# Patient Record
Sex: FEMALE | Race: WHITE | Employment: OTHER | URBAN - METROPOLITAN AREA
[De-identification: names, ages, dates, MRNs, and addresses within clinical notes are randomized per-mention and may not be internally consistent; named-entity substitution may affect disease eponyms.]

---

## 2017-01-01 ENCOUNTER — HOSPITAL ENCOUNTER (OUTPATIENT)
Dept: OUTPATIENT PROCEDURES | Facility: CLINIC | Age: 82
Discharge: HOME | End: 2017-06-09
Attending: OPHTHALMOLOGY | Admitting: OPHTHALMOLOGY
Payer: MEDICARE

## 2017-01-01 ENCOUNTER — OFFICE VISIT (OUTPATIENT)
Dept: FAMILY MEDICINE | Facility: CLINIC | Age: 82
End: 2017-01-01
Payer: COMMERCIAL

## 2017-01-01 ENCOUNTER — TRANSFERRED RECORDS (OUTPATIENT)
Dept: HEALTH INFORMATION MANAGEMENT | Facility: CLINIC | Age: 82
End: 2017-01-01

## 2017-01-01 VITALS
HEART RATE: 71 BPM | SYSTOLIC BLOOD PRESSURE: 131 MMHG | DIASTOLIC BLOOD PRESSURE: 77 MMHG | WEIGHT: 127.4 LBS | BODY MASS INDEX: 25.01 KG/M2 | HEIGHT: 60 IN | TEMPERATURE: 96.9 F

## 2017-01-01 DIAGNOSIS — E03.9 ACQUIRED HYPOTHYROIDISM: ICD-10-CM

## 2017-01-01 DIAGNOSIS — L91.8 SKIN TAG: ICD-10-CM

## 2017-01-01 DIAGNOSIS — G25.81 RESTLESS LEG SYNDROME: ICD-10-CM

## 2017-01-01 DIAGNOSIS — Z00.00 MEDICARE ANNUAL WELLNESS VISIT, SUBSEQUENT: Primary | ICD-10-CM

## 2017-01-01 DIAGNOSIS — I10 HYPERTENSION GOAL BP (BLOOD PRESSURE) < 140/90: ICD-10-CM

## 2017-01-01 DIAGNOSIS — G62.9 PERIPHERAL POLYNEUROPATHY: ICD-10-CM

## 2017-01-01 DIAGNOSIS — L85.1 ACQUIRED KERATODERMA: ICD-10-CM

## 2017-01-01 LAB
ANION GAP SERPL CALCULATED.3IONS-SCNC: 8 MMOL/L (ref 3–14)
BUN SERPL-MCNC: 20 MG/DL (ref 7–30)
CALCIUM SERPL-MCNC: 9.1 MG/DL (ref 8.5–10.1)
CHLORIDE SERPL-SCNC: 105 MMOL/L (ref 94–109)
CO2 SERPL-SCNC: 25 MMOL/L (ref 20–32)
CREAT SERPL-MCNC: 0.86 MG/DL (ref 0.52–1.04)
GFR SERPL CREATININE-BSD FRML MDRD: 61 ML/MIN/1.7M2
GLUCOSE SERPL-MCNC: 89 MG/DL (ref 70–99)
POTASSIUM SERPL-SCNC: 4.5 MMOL/L (ref 3.4–5.3)
SODIUM SERPL-SCNC: 138 MMOL/L (ref 133–144)
TSH SERPL DL<=0.005 MIU/L-ACNC: 1.68 MU/L (ref 0.4–4)

## 2017-01-01 PROCEDURE — G0438 PPPS, INITIAL VISIT: HCPCS | Performed by: FAMILY MEDICINE

## 2017-01-01 PROCEDURE — 36415 COLL VENOUS BLD VENIPUNCTURE: CPT | Performed by: FAMILY MEDICINE

## 2017-01-01 PROCEDURE — 80048 BASIC METABOLIC PNL TOTAL CA: CPT | Performed by: FAMILY MEDICINE

## 2017-01-01 PROCEDURE — 84443 ASSAY THYROID STIM HORMONE: CPT | Performed by: FAMILY MEDICINE

## 2017-01-01 PROCEDURE — 11200 RMVL SKIN TAGS UP TO&INC 15: CPT | Performed by: FAMILY MEDICINE

## 2017-01-01 PROCEDURE — 66821 AFTER CATARACT LASER SURGERY: CPT | Mod: 50 | Performed by: OPHTHALMOLOGY

## 2017-01-01 RX ORDER — GABAPENTIN 300 MG/1
CAPSULE ORAL
Qty: 450 CAPSULE | Refills: 0 | Status: SHIPPED | OUTPATIENT
Start: 2017-01-01 | End: 2017-01-01

## 2017-01-01 RX ORDER — GABAPENTIN 300 MG/1
CAPSULE ORAL
Qty: 450 CAPSULE | Refills: 3 | Status: SHIPPED | OUTPATIENT
Start: 2017-01-01

## 2017-01-01 RX ORDER — LEVOTHYROXINE SODIUM 50 UG/1
50 TABLET ORAL DAILY
Qty: 90 TABLET | Refills: 3 | Status: SHIPPED | OUTPATIENT
Start: 2017-01-01

## 2017-01-01 RX ORDER — LISINOPRIL 5 MG/1
TABLET ORAL
Qty: 90 TABLET | Refills: 0 | Status: SHIPPED | OUTPATIENT
Start: 2017-01-01 | End: 2017-01-01

## 2017-01-01 RX ORDER — AMMONIUM LACTATE 12 G/100G
CREAM TOPICAL 2 TIMES DAILY
Qty: 280 G | Refills: 11 | Status: SHIPPED | OUTPATIENT
Start: 2017-01-01

## 2017-01-01 RX ORDER — LISINOPRIL 5 MG/1
5 TABLET ORAL DAILY
Qty: 30 TABLET | Refills: 0 | Status: SHIPPED | OUTPATIENT
Start: 2017-01-01 | End: 2017-01-01

## 2017-01-01 RX ORDER — LISINOPRIL 5 MG/1
5 TABLET ORAL DAILY
Qty: 90 TABLET | Refills: 3 | Status: SHIPPED | OUTPATIENT
Start: 2017-01-01

## 2017-04-11 NOTE — TELEPHONE ENCOUNTER
Routing refill request to provider for review/approval because:  Drug not on the [facility] refill protocol   Please advise   Thank you   Yazmin FRIEDMAN RN

## 2017-04-11 NOTE — TELEPHONE ENCOUNTER
Gabapentin      Last Written Prescription Date: 03/14/16  Last Quantity: 450, # refills: 3  Last Office Visit with [facility], [facility] or [facility] prescribing provider: 03/14/16       Creatinine   Date Value Ref Range Status   03/11/2016 0.81 0.52 - 1.04 mg/dL Final     No results found for: AST  No results found for: ALT  BP Readings from Last 3 Encounters:   03/14/16 126/78   01/21/16 156/86   03/12/15 140/76

## 2017-04-19 NOTE — TELEPHONE ENCOUNTER
lisinopril (PRINIVIL,ZESTRIL) 5 MG tablet      Last Written Prescription Date: 3/14/16  Last Fill Quantity: 90, # refills: 3  Last Office Visit with [facility] or [facility] prescribing provider: 3/14/16       Potassium   Date Value Ref Range Status   03/11/2016 4.4 3.4 - 5.3 mmol/L Final     Creatinine   Date Value Ref Range Status   03/11/2016 0.81 0.52 - 1.04 mg/dL Final     BP Readings from Last 3 Encounters:   03/14/16 126/78   01/21/16 156/86   03/12/15 140/76

## 2017-05-22 NOTE — TELEPHONE ENCOUNTER
Routing refill request to provider for review/approval because:  Jing given x1 and patient did not follow up, please advise  Labs not current:  Older than 1 year.   Pt states that she needs the medication today, and can not come into the clinic for an appt until June.   Please advise   Yazmin FRIEDMAN RN            Lisinopril   Last Written Prescription Date: 04/21/17  Last Fill Quantity: 30, # refills: 0   Last Office Visit with [facility], [facility] or [facility] prescribing provider: 03/14/16   Future Office Visit:     Potassium   Date Value Ref Range Status   03/11/2016 4.4 3.4 - 5.3 mmol/L Final     Creatinine   Date Value Ref Range Status   03/11/2016 0.81 0.52 - 1.04 mg/dL Final     BP Readings from Last 3 Encounters:   03/14/16 126/78   01/21/16 156/86   03/12/15 140/76

## 2017-05-22 NOTE — TELEPHONE ENCOUNTER
Reason for Call:  Other prescription    Detailed comments: Pt called stating that she wants Lisinopril refill today - I reminded pt that she is overdue for a clinic visit and she states that she cannot get in for an appt until June and she needs the refill ASAP today.  Please call patient  and advise.    Lisinopril      Last Written Prescription Date: 04/21/17  Last Fill Quantity: 30, # refills: 0    Last Office Visit with [facility] or [facility] prescribing provider:  03/14/16   Future Office Visit:        BP Readings from Last 3 Encounters:   03/14/16 126/78   01/21/16 156/86   03/12/15 140/76     Phone Number Patient can be reached at: Home number on file [phone] (home)    Best Time: any    Can we leave a detailed message on this number? YES    Call taken on 5/22/2017 at 1:13 PM by Cammy Marie

## 2017-06-05 PROBLEM — E03.9 ACQUIRED HYPOTHYROIDISM: Status: ACTIVE | Noted: 2017-01-01

## 2017-06-05 NOTE — MR AVS SNAPSHOT
After Visit Summary   6/5/2017    Silvana George    MRN: [id number]           Patient Information     Date Of Birth          6/27/1924        Visit Information        Provider Department      6/5/2017 9:00 AM ADWOA Lopes MD [facility]        Today's Diagnoses     Medicare annual wellness visit, subsequent    -  1    Hypertension goal BP (blood pressure) < 140/90        Acquired keratoderma        Peripheral polyneuropathy (HCC)        Restless leg syndrome        Acquired hypothyroidism        Skin tag          Care Instructions      Preventive Health Recommendations    Female Ages 65 +    Yearly exam:     See your health care provider every year in order to  o Review health changes.   o Discuss preventive care.    o Review your medicines if your doctor has prescribed any.      You no longer need a yearly Pap test unless you've had an abnormal Pap test in the past 10 years. If you have vaginal symptoms, such as bleeding or discharge, be sure to talk with your provider about a Pap test.      Every 1 to 2 years, have a mammogram.  If you are over 69, talk with your health care provider about whether or not you want to continue having screening mammograms.      Every 10 years, have a colonoscopy. Or, have a yearly FIT test (stool test). These exams will check for colon cancer.       Have a cholesterol test every 5 years, or more often if your doctor advises it.       Have a diabetes test (fasting glucose) every three years. If you are at risk for diabetes, you should have this test more often.       At age 65, have a bone density scan (DEXA) to check for osteoporosis (brittle bone disease).    Shots:    Get a flu shot each year.    Get a tetanus shot every 10 years.    Talk to your doctor about your pneumonia vaccines. There are now two you should receive - Pneumovax (PPSV 23) and Prevnar (PCV 13).    Talk to your doctor about the shingles vaccine.    Talk to your doctor about the  "hepatitis B vaccine.    Nutrition:     Eat at least 5 servings of fruits and vegetables each day.      Eat whole-grain bread, whole-wheat pasta and brown rice instead of white grains and rice.      Talk to your provider about Calcium and Vitamin D.     Lifestyle    Exercise at least 150 minutes a week (30 minutes a day, 5 days a week). This will help you control your weight and prevent disease.      Limit alcohol to one drink per day.      No smoking.       Wear sunscreen to prevent skin cancer.       See your dentist twice a year for an exam and cleaning.      See your eye doctor every 1 to 2 years to screen for conditions such as glaucoma, macular degeneration and cataracts.          Follow-ups after your visit        Who to contact     If you have questions or need follow up information about today's clinic visit or your schedule please contact [facility] directly at [phone].  Normal or non-critical lab and imaging results will be communicated to you by Citymap[employer]art, letter or phone within 4 business days after the clinic has received the results. If you do not hear from us within 7 days, please contact the clinic through Citymap[employer]art or phone. If you have a critical or abnormal lab result, we will notify you by phone as soon as possible.  Submit refill requests through zweitgeist or call your pharmacy and they will forward the refill request to us. Please allow 3 business days for your refill to be completed.          Additional Information About Your Visit        [employer] Information     zweitgeist lets you send messages to your doctor, view your test results, renew your prescriptions, schedule appointments and more. To sign up, go to [email or web address] . Click on \"Log in\" on the left side of the screen, which will take you to the Welcome page. Then click on \"Sign up Now\" on the right side of the page.     You will be asked to enter the access code listed below, as well as some personal " "information. Please follow the directions to create your username and password.     Your access code is: [email or web address]  Expires: 9/3/2017  1:41 PM     Your access code will  in 90 days. If you need help or a new code, please call your [place] clinic or [phone].        Care EveryWhere ID     This is your Care EveryWhere ID. This could be used by other organizations to access your [place] medical records  [id number]        Your Vitals Were     Pulse Temperature Height BMI (Body Mass Index)          71 96.9  F (36.1  C) (Oral) 4' 11.5\" (1.511 m) 25.3 kg/m2         Blood Pressure from Last 3 Encounters:   17 131/77   16 126/78   16 156/86    Weight from Last 3 Encounters:   17 127 lb 6.4 oz (57.8 kg)   16 130 lb 1.6 oz (59 kg)   03/12/15 139 lb 3.2 oz (63.1 kg)              We Performed the Following     Basic metabolic panel     REMOVAL OF SKIN TAGS, FIRST 15     TSH with free T4 reflex          Today's Medication Changes          These changes are accurate as of: 17  1:43 PM.  If you have any questions, ask your nurse or doctor.               Start taking these medicines.        Dose/Directions    ammonium lactate 12 % cream   Commonly known as:  AMLACTIN   Used for:  Acquired keratoderma   Started by:  ADWOA Lopes MD        Apply topically 2 times daily   Quantity:  280 g   Refills:  11         These medicines have changed or have updated prescriptions.        Dose/Directions    gabapentin 300 MG capsule   Commonly known as:  NEURONTIN   This may have changed:  See the new instructions.   Used for:  Restless leg syndrome   Changed by:  ADWOA Lopes MD        TAKE THREE CAPSULES BY MOUTH AT BEDTIME, AND TAKE 1 OR 2  ADDITIONAL CAPSULES DURING  THE  NIGHT IF NEEDED   Quantity:  450 capsule   Refills:  3       lisinopril 5 MG tablet   Commonly known as:  PRINIVIL/ZESTRIL   This may have changed:  additional instructions   Used for:  Hypertension goal BP (blood pressure) " < 140/90   Changed by:  ADWOA Lopes MD        Dose:  5 mg   Take 1 tablet (5 mg) by mouth daily   Quantity:  90 tablet   Refills:  3            Where to get your medicines      These medications were sent to [facility] - [place], [place] - [address]  [address] [facility] [address]     Phone:  [phone]     ammonium lactate 12 % cream    gabapentin 300 MG capsule    levothyroxine 50 MCG tablet    lisinopril 5 MG tablet                Primary Care Provider Office Phone # Fax #    ADWOA Lopes [phone]       [facility] [id number] [place] [id number]        Thank you!     Thank you for choosing [facility]  for your care. Our goal is always to provide you with excellent care. Hearing back from our patients is one way we can continue to improve our services. Please take a few minutes to complete the written survey that you may receive in the mail after your visit with us. Thank you!             Your Updated Medication List - Protect others around you: Learn how to safely use, store and throw away your medicines at www.disposemymeds.org.          This list is accurate as of: 6/5/17  1:43 PM.  Always use your most recent med list.                   Brand Name Dispense Instructions for use    ammonium lactate 12 % cream    AMLACTIN    280 g    Apply topically 2 times daily       aspirin 81 MG tablet      1 TABLET DAILY       gabapentin 300 MG capsule    NEURONTIN    450 capsule    TAKE THREE CAPSULES BY MOUTH AT BEDTIME, AND TAKE 1 OR 2  ADDITIONAL CAPSULES DURING  THE  NIGHT IF NEEDED       levothyroxine 50 MCG tablet    SYNTHROID    90 tablet    Take 1 tablet (50 mcg) by mouth daily       lisinopril 5 MG tablet    PRINIVIL/ZESTRIL    90 tablet    Take 1 tablet (5 mg) by mouth daily       OSTEO ADVANCE PO      Take  by mouth 3 times daily.       TYLENOL ARTHRITIS PAIN 650 MG CR tablet   Generic drug:  acetaminophen      Take 650  mg by mouth every 4 hours.

## 2017-06-05 NOTE — LETTER
[facility]  [address]  [place] [address]  Phone: [phone]    June 6, 2017    Silvana Ariel  [address]   [place] [address]          Dear MsOral Ariel,    The results of your recent lab tests were within normal limits. Enclosed is a copy of these results.  If you have any further questions or problems, please contact our office.    Sincerely,      SERGIO Lopes MD/ la

## 2017-06-05 NOTE — NURSING NOTE
"Initial /77  Pulse 71  Temp 96.9  F (36.1  C) (Oral)  Ht 4' 11.5\" (1.511 m)  Wt 127 lb 6.4 oz (57.8 kg)  BMI 25.3 kg/m2 Estimated body mass index is 25.3 kg/(m^2) as calculated from the following:    Height as of this encounter: 4' 11.5\" (1.511 m).    Weight as of this encounter: 127 lb 6.4 oz (57.8 kg). .      "

## 2017-06-05 NOTE — PROGRESS NOTES
SUBJECTIVE:                                                            Silvana George is a 89 year old female who presents for Preventive Visit.    Are you in the first 12 months of your Medicare Part B coverage?  No    Healthy Habits:    Do you get at least three servings of calcium containing foods daily (dairy, green leafy vegetables, etc.)? yes    Amount of exercise or daily activities, outside of work: 7 day(s) per week    Problems taking medications regularly No    Medication side effects: No    Have you had an eye exam in the past two years? yes    Do you see a dentist twice per year? yes    Do you have sleep apnea, excessive snoring or daytime drowsiness?yes    COGNITIVE SCREEN  1) Repeat 3 items (Banana, Sunrise, Chair)    2) Clock draw: NORMAL  3) 3 item recall: Recalls 1 object   Results: NORMAL clock, 1-2 items recalled: COGNITIVE IMPAIRMENT LESS LIKELY    Mini-CogTM Copyright S Barry. Licensed by the author for use in [facility]; reprinted with permission (sly@[facility]). All rights reserved.            PROBLEMS TO ADD ON...  She has 2 skin tags that are enlarging and tender get caught on her clothing, would like to have them removed. One is on her right forearm and one on the left upper    Reviewed and updated as needed this visit by clinical staff         Reviewed and updated as needed this visit by Provider        Social History   Substance Use Topics     Smoking status: Never Smoker     Smokeless tobacco: Never Used     Alcohol use No       The patient does not drink >3 drinks per day nor >7 drinks per week.    Today's PHQ-2 Score:   PHQ-2 ( 1999 Pfizer) 3/14/2016 3/12/2015   Q1: Little interest or pleasure in doing things 0 0   Q2: Feeling down, depressed or hopeless 0 0   PHQ-2 Score 0 0       Do you feel safe in your environment - Yes    Do you have a Health Care Directive?: Yes: Advance Directive has been received and scanned.    Current providers sharing in care for this patient  "include:   Patient Care Team:  ADWOA Lopes MD as PCP - General (Family Practice)      Hearing impairment: Yes, Has hearing aids    Ability to successfully perform activities of daily living: Yes, no assistance needed     Fall risk:  Fallen 2 or more times in the past year?: No  Any fall with injury in the past year?: No, but is apprehensive of falling, uses a cane    Home safety:  none identified  click delete button to remove this line now    The following health maintenance items are reviewed in Epic and correct as of today:  Health Maintenance   Topic Date Due     ADVANCE DIRECTIVE PLANNING Q5 YRS  06/27/1942     PNEUMOCOCCAL (1 of 2 - PCV13) 06/27/1989     TETANUS IMMUNIZATION (SYSTEM ASSIGNED)  09/23/2007     TSH Q1 YEAR  03/11/2017     FALL RISK ASSESSMENT  03/14/2017     INFLUENZA VACCINE (SYSTEM ASSIGNED)  09/01/2017         Pneumonia Vaccine: She declines the pneumonia vaccine and updating her tetanus vaccine     ROS:  Constitutional, HEENT, cardiovascular, pulmonary, gi and gu systems are negative, except as otherwise noted.      OBJECTIVE:                                                            /77  Pulse 71  Temp 96.9  F (36.1  C) (Oral)  Ht 4' 11.5\" (1.511 m)  Wt 127 lb 6.4 oz (57.8 kg)  BMI 25.3 kg/m2 Estimated body mass index is 26.28 kg/(m^2) as calculated from the following:    Height as of 3/14/16: 4' 11\" (1.499 m).    Weight as of 3/14/16: 130 lb 1.6 oz (59 kg).  EXAM:   GENERAL: healthy, alert and no distress  EYES: Eyes grossly normal to inspection, PERRL and conjunctivae and sclerae normal  HENT: ear canals and TM's normal, nose and mouth without ulcers or lesions  NECK: no adenopathy, no asymmetry, masses, or scars and thyroid normal to palpation  RESP: lungs clear to auscultation - no rales, rhonchi or wheezes  CV: regular rate and rhythm, normal S1 S2, no S3 or S4, no murmur, click or rub, no peripheral edema and peripheral pulses strong  ABDOMEN: soft, nontender, no " hepatosplenomegaly, no masses and bowel sounds normal  MS: no gross musculoskeletal defects noted, no edema  SKIN: 2 pedunculated skin tags one on the left upper arm and one on the right forearm that rub on her clothing and get irritated  NEURO: Normal strength and tone, mentation intact and speech normal; gait is somewhat unsteady  PSYCH: mentation appears normal, affect normal/bright    ASSESSMENT / PLAN:                                                              ASSESSMENT:  1. Medicare annual wellness visit, subsequent    2. Hypertension goal BP (blood pressure) < 140/90    3. Acquired keratoderma    4. Peripheral polyneuropathy (HCC)    5. Restless leg syndrome    6. Acquired hypothyroidism    7. Skin tag        PLAN:  Orders Placed This Encounter     REMOVAL OF SKIN TAGS, FIRST 15     Basic metabolic panel     TSH with free T4 reflex     lisinopril (PRINIVIL/ZESTRIL) 5 MG tablet     ammonium lactate (AMLACTIN) 12 % cream     levothyroxine (SYNTHROID) 50 MCG tablet     gabapentin (NEURONTIN) 300 MG capsule     Skin tags were anesthetized with 1% lidocaine after prepping with Hibiclens. Both were snipped off with the iris scissors and the base cauterized with Hyfrecator. Band-Aid applied    Patient Instructions     Preventive Health Recommendations    Female Ages 65 +    Yearly exam:     See your health care provider every year in order to  o Review health changes.   o Discuss preventive care.    o Review your medicines if your doctor has prescribed any.      You no longer need a yearly Pap test unless you've had an abnormal Pap test in the past 10 years. If you have vaginal symptoms, such as bleeding or discharge, be sure to talk with your provider about a Pap test.      Every 1 to 2 years, have a mammogram.  If you are over 69, talk with your health care provider about whether or not you want to continue having screening mammograms.      Every 10 years, have a colonoscopy. Or, have a yearly FIT test (stool  "test). These exams will check for colon cancer.       Have a cholesterol test every 5 years, or more often if your doctor advises it.       Have a diabetes test (fasting glucose) every three years. If you are at risk for diabetes, you should have this test more often.       At age 65, have a bone density scan (DEXA) to check for osteoporosis (brittle bone disease).    Shots:    Get a flu shot each year.    Get a tetanus shot every 10 years.    Talk to your doctor about your pneumonia vaccines. There are now two you should receive - Pneumovax (PPSV 23) and Prevnar (PCV 13).    Talk to your doctor about the shingles vaccine.    Talk to your doctor about the hepatitis B vaccine.    Nutrition:     Eat at least 5 servings of fruits and vegetables each day.      Eat whole-grain bread, whole-wheat pasta and brown rice instead of white grains and rice.      Talk to your provider about Calcium and Vitamin D.     Lifestyle    Exercise at least 150 minutes a week (30 minutes a day, 5 days a week). This will help you control your weight and prevent disease.      Limit alcohol to one drink per day.      No smoking.       Wear sunscreen to prevent skin cancer.       See your dentist twice a year for an exam and cleaning.      See your eye doctor every 1 to 2 years to screen for conditions such as glaucoma, macular degeneration and cataracts.       End of Life Planning:  Patient currently has an advanced directive: Yes.  Practitioner is supportive of decision.    COUNSELING:  Reviewed preventive health counseling, as reflected in patient instructions       Regular exercise       Healthy diet/nutrition       Vision screening       Hearing screening       Dental care       Immunizations    Declined: Pneumococcal and TDAP due to Concerns about side effects/safety              Estimated body mass index is 26.28 kg/(m^2) as calculated from the following:    Height as of 3/14/16: 4' 11\" (1.499 m).    Weight as of 3/14/16: 130 lb 1.6 oz " (59 kg).     reports that she has never smoked. She has never used smokeless tobacco.      Appropriate preventive services were discussed with this patient, including applicable screening as appropriate for cardiovascular disease, diabetes, osteopenia/osteoporosis, and glaucoma.  As appropriate for age/gender, discussed screening for colorectal cancer, prostate cancer, breast cancer, and cervical cancer. Checklist reviewing preventive services available has been given to the patient.    Reviewed patients plan of care and provided an AVS. The Basic Care Plan (routine screening as documented in Health Maintenance) for Silvana meets the Care Plan requirement. This Care Plan has been established and reviewed with the Patient.    Counseling Resources:  ATP IV Guidelines  Pooled Cohorts Equation Calculator  Breast Cancer Risk Calculator  FRAX Risk Assessment  ICSI Preventive Guidelines  Dietary Guidelines for Americans, 2010  USDA's MyPlate  ASA Prophylaxis  Lung CA Screening    ADWOA Lopes MD  [facility][note 3]